# Patient Record
Sex: FEMALE | NOT HISPANIC OR LATINO
[De-identification: names, ages, dates, MRNs, and addresses within clinical notes are randomized per-mention and may not be internally consistent; named-entity substitution may affect disease eponyms.]

---

## 2020-12-08 DIAGNOSIS — Z01.818 ENCOUNTER FOR OTHER PREPROCEDURAL EXAMINATION: ICD-10-CM

## 2020-12-10 ENCOUNTER — APPOINTMENT (OUTPATIENT)
Dept: DISASTER EMERGENCY | Facility: CLINIC | Age: 80
End: 2020-12-10

## 2020-12-12 LAB — SARS-COV-2 N GENE NPH QL NAA+PROBE: NOT DETECTED

## 2020-12-24 ENCOUNTER — APPOINTMENT (OUTPATIENT)
Dept: DISASTER EMERGENCY | Facility: CLINIC | Age: 80
End: 2020-12-24

## 2020-12-26 LAB — SARS-COV-2 N GENE NPH QL NAA+PROBE: NOT DETECTED

## 2023-03-15 ENCOUNTER — APPOINTMENT (OUTPATIENT)
Dept: OTOLARYNGOLOGY | Facility: CLINIC | Age: 83
End: 2023-03-15
Payer: MEDICARE

## 2023-03-15 VITALS
HEIGHT: 64 IN | SYSTOLIC BLOOD PRESSURE: 145 MMHG | WEIGHT: 187 LBS | DIASTOLIC BLOOD PRESSURE: 77 MMHG | HEART RATE: 81 BPM | BODY MASS INDEX: 31.92 KG/M2

## 2023-03-15 PROCEDURE — 92557 COMPREHENSIVE HEARING TEST: CPT

## 2023-03-15 PROCEDURE — 99203 OFFICE O/P NEW LOW 30 MIN: CPT

## 2023-03-15 PROCEDURE — 92567 TYMPANOMETRY: CPT

## 2023-03-15 NOTE — HISTORY OF PRESENT ILLNESS
[de-identified] : Pt presents today c/o hearing loss and tinnitus. Pt notes people around her complain that the TV is too loud. Pt notes she has experienced right tinnitus recently, around the same time that she got the covid vaccine. Pt notes the hearing loss has been deteriorating gradually, unrelated to the covid vaccine. Pt notes she only notices the tinnitus when it is quiet. Pt notes she thinks her right ear might be slightly worse hearing than the left. Pt notes family history of hearing loss. Pt notes she can see good, but there is a difference between the left and right eye.

## 2023-03-15 NOTE — ASSESSMENT
[FreeTextEntry1] : Reviewed and reconciled medications, allergies, PMHx, PSHx, SocHx, FMHx.\par \par Pt presents today c/o hearing loss. Pt notes people around her complain that the TV is too loud. Pt notes she has experienced right tinnitus recently, around the same time that she got the covid vaccine. Pt notes the hearing loss has been deteriorating gradually, unrelated to the covid vaccine. \par \par Physical Exam -\par right ear: cerumen removed\par inflamed turbs, deviated septum right severe\par equal, previous cataract surgery in both eyes\par \par Audio:\par -TYMPS: TYPE A AU \par -AD: ESSENTIALLY -8000 HZ, WITH A MILD SNHL 3-6KHZ, 100% discrim at 65db, TPP -9\par -AS: ESSENTIALLY -8000 HZ, 100% discrim at 60db, TPP -3\par \par Plan:\par Audio - results interpreted by Dr. Mackey and reviewed with the patient. Consider hearing aids. FU 1 year.

## 2023-03-15 NOTE — PHYSICAL EXAM
[Hearing Pedraza Test (Tuning Fork On Forehead)] : no lateralization of tone [] : septum deviated to the right [Midline] : trachea located in midline position [Removed] : palatine tonsils previously removed [Normal] : orientation to person, place, and time: normal [FreeTextEntry8] : cerumen removed via curettage  [FreeTextEntry9] : . [de-identified] : severely deviated septum right [de-identified] : inflamed turbs  [FreeTextEntry2] : sinuses nontender to percussion. sensations intact.  [de-identified] : equal, previous cataract surgery in both eyes

## 2023-03-15 NOTE — REVIEW OF SYSTEMS
[Seasonal Allergies] : seasonal allergies [Hearing Loss] : hearing loss [Ear Noises] : ear noises [Eyes Itch] : itching of the eyes [Heartburn] : heartburn [Joint Pain] : joint pain [Negative] : Heme/Lymph [FreeTextEntry3] : watery [FreeTextEntry7] : reflux [FreeTextEntry1] : itching

## 2023-03-15 NOTE — DATA REVIEWED
[de-identified] : \par -TYMPS: TYPE A AU \par -AD: ESSENTIALLY -8000 HZ, WITH A MILD SNHL 3-6KHZ\par -AS: ESSENTIALLY -8000 HZ

## 2023-05-02 ENCOUNTER — OFFICE (OUTPATIENT)
Facility: LOCATION | Age: 83
Setting detail: OPHTHALMOLOGY
End: 2023-05-02
Payer: MEDICARE

## 2023-05-02 DIAGNOSIS — H43.813: ICD-10-CM

## 2023-05-02 DIAGNOSIS — H35.372: ICD-10-CM

## 2023-05-02 DIAGNOSIS — E11.9: ICD-10-CM

## 2023-05-02 DIAGNOSIS — H35.342: ICD-10-CM

## 2023-05-02 PROCEDURE — 92134 CPTRZ OPH DX IMG PST SGM RTA: CPT | Performed by: OPHTHALMOLOGY

## 2023-05-02 PROCEDURE — 92014 COMPRE OPH EXAM EST PT 1/>: CPT | Performed by: OPHTHALMOLOGY

## 2023-05-02 ASSESSMENT — KERATOMETRY
OD_K1POWER_DIOPTERS: 42.25
OS_K1POWER_DIOPTERS: 42.12
OS_K2POWER_DIOPTERS: 43.87
OD_AXISANGLE_DEGREES: 84
OS_AXISANGLE_DEGREES: 89
OD_K2POWER_DIOPTERS: 44.37

## 2023-05-02 ASSESSMENT — SPHEQUIV_DERIVED
OS_SPHEQUIV: -0.25
OD_SPHEQUIV: -0.625

## 2023-05-02 ASSESSMENT — REFRACTION_AUTOREFRACTION
OS_SPHERE: +0.25
OS_CYLINDER: -1.00
OD_CYLINDER: -0.75
OS_AXIS: 44
OD_SPHERE: -0.25
OD_AXIS: 81

## 2023-05-02 ASSESSMENT — AXIALLENGTH_DERIVED
OD_AL: 23.9094
OS_AL: 23.8782

## 2023-05-02 ASSESSMENT — VISUAL ACUITY
OD_BCVA: 20/25
OS_BCVA: 20/20

## 2023-05-02 ASSESSMENT — CONFRONTATIONAL VISUAL FIELD TEST (CVF)
OS_FINDINGS: FULL
OD_FINDINGS: FULL

## 2023-07-24 ENCOUNTER — OFFICE (OUTPATIENT)
Dept: URBAN - METROPOLITAN AREA CLINIC 109 | Facility: CLINIC | Age: 83
Setting detail: OPHTHALMOLOGY
End: 2023-07-24
Payer: MEDICARE

## 2023-07-24 DIAGNOSIS — H35.372: ICD-10-CM

## 2023-07-24 DIAGNOSIS — H43.813: ICD-10-CM

## 2023-07-24 DIAGNOSIS — E11.9: ICD-10-CM

## 2023-07-24 DIAGNOSIS — H35.342: ICD-10-CM

## 2023-07-24 DIAGNOSIS — H26.493: ICD-10-CM

## 2023-07-24 PROCEDURE — 92014 COMPRE OPH EXAM EST PT 1/>: CPT | Performed by: OPHTHALMOLOGY

## 2023-07-24 ASSESSMENT — VISUAL ACUITY
OS_BCVA: 20/20-1
OD_BCVA: 20/30-2

## 2023-07-24 ASSESSMENT — CONFRONTATIONAL VISUAL FIELD TEST (CVF)
OD_FINDINGS: FULL
OS_FINDINGS: FULL

## 2023-07-24 ASSESSMENT — TONOMETRY
OS_IOP_MMHG: 17
OD_IOP_MMHG: 17

## 2023-07-24 ASSESSMENT — REFRACTION_AUTOREFRACTION
OS_AXIS: 44
OS_CYLINDER: -1.00
OS_SPHERE: +0.25
OD_CYLINDER: -0.75
OD_SPHERE: -0.25
OD_AXIS: 81

## 2023-07-24 ASSESSMENT — KERATOMETRY
OD_AXISANGLE_DEGREES: 84
OS_AXISANGLE_DEGREES: 89
OS_K2POWER_DIOPTERS: 43.87
OD_K1POWER_DIOPTERS: 42.25
OS_K1POWER_DIOPTERS: 42.12
OD_K2POWER_DIOPTERS: 44.37

## 2023-07-24 ASSESSMENT — SPHEQUIV_DERIVED
OD_SPHEQUIV: -0.625
OS_SPHEQUIV: -0.25

## 2023-07-24 ASSESSMENT — AXIALLENGTH_DERIVED
OS_AL: 23.8782
OD_AL: 23.9094

## 2023-08-04 ENCOUNTER — OFFICE (OUTPATIENT)
Dept: URBAN - METROPOLITAN AREA CLINIC 109 | Facility: CLINIC | Age: 83
Setting detail: OPHTHALMOLOGY
End: 2023-08-04
Payer: MEDICARE

## 2023-08-04 DIAGNOSIS — H26.492: ICD-10-CM

## 2023-08-04 PROBLEM — H26.493 POSTERIOR CAPSULAR OPACIFICATION; BOTH EYES: Status: ACTIVE | Noted: 2023-07-24

## 2023-08-04 PROCEDURE — 66821 AFTER CATARACT LASER SURGERY: CPT | Performed by: OPHTHALMOLOGY

## 2023-08-04 ASSESSMENT — REFRACTION_AUTOREFRACTION
OD_AXIS: 81
OD_CYLINDER: -0.75
OS_CYLINDER: -1.00
OD_SPHERE: -0.25
OS_AXIS: 44
OS_SPHERE: +0.25

## 2023-08-04 ASSESSMENT — SPHEQUIV_DERIVED
OD_SPHEQUIV: -0.625
OS_SPHEQUIV: -0.25

## 2023-08-04 ASSESSMENT — KERATOMETRY
OD_K2POWER_DIOPTERS: 44.37
OD_K1POWER_DIOPTERS: 42.25
OS_K1POWER_DIOPTERS: 42.12
OS_AXISANGLE_DEGREES: 89
OD_AXISANGLE_DEGREES: 84
OS_K2POWER_DIOPTERS: 43.87

## 2023-08-04 ASSESSMENT — VISUAL ACUITY
OD_BCVA: 20/30-2
OS_BCVA: 20/20-1

## 2023-08-04 ASSESSMENT — CONFRONTATIONAL VISUAL FIELD TEST (CVF)
OS_FINDINGS: FULL
OD_FINDINGS: FULL

## 2023-08-04 ASSESSMENT — AXIALLENGTH_DERIVED
OS_AL: 23.8782
OD_AL: 23.9094

## 2023-08-18 ENCOUNTER — OFFICE (OUTPATIENT)
Dept: URBAN - METROPOLITAN AREA CLINIC 109 | Facility: CLINIC | Age: 83
Setting detail: OPHTHALMOLOGY
End: 2023-08-18
Payer: MEDICARE

## 2023-08-18 DIAGNOSIS — H40.033: ICD-10-CM

## 2023-08-18 DIAGNOSIS — H26.493: ICD-10-CM

## 2023-08-18 PROCEDURE — 99024 POSTOP FOLLOW-UP VISIT: CPT | Performed by: OPHTHALMOLOGY

## 2023-08-18 ASSESSMENT — REFRACTION_CURRENTRX
OS_ADD: +2.00
OD_AXIS: 033
OS_OVR_VA: 20/
OD_CYLINDER: -1.00
OS_CYLINDER: -0.50
OD_VPRISM_DIRECTION: PROGS
OD_OVR_VA: 20/
OD_ADD: +2.00
OD_SPHERE: +0.50
OS_AXIS: 038
OS_SPHERE: +0.50
OS_VPRISM_DIRECTION: PROGS

## 2023-08-18 ASSESSMENT — KERATOMETRY
OD_AXISANGLE_DEGREES: 84
OS_K2POWER_DIOPTERS: 43.87
OD_K1POWER_DIOPTERS: 42.25
OS_AXISANGLE_DEGREES: 89
OD_K2POWER_DIOPTERS: 44.37
OS_K1POWER_DIOPTERS: 42.12

## 2023-08-18 ASSESSMENT — CONFRONTATIONAL VISUAL FIELD TEST (CVF)
OD_FINDINGS: FULL
OS_FINDINGS: FULL

## 2023-08-18 ASSESSMENT — SPHEQUIV_DERIVED: OS_SPHEQUIV: -0.25

## 2023-08-18 ASSESSMENT — VISUAL ACUITY
OD_BCVA: 20/20-2
OS_BCVA: 20/20

## 2023-08-18 ASSESSMENT — REFRACTION_AUTOREFRACTION
OS_SPHERE: +0.25
OS_CYLINDER: -1.00
OD_CYLINDER: -0.25
OD_AXIS: 077
OS_AXIS: 041
OD_SPHERE: PLANO

## 2023-08-18 ASSESSMENT — AXIALLENGTH_DERIVED: OS_AL: 23.8782

## 2024-02-16 ENCOUNTER — OFFICE (OUTPATIENT)
Dept: URBAN - METROPOLITAN AREA CLINIC 109 | Facility: CLINIC | Age: 84
Setting detail: OPHTHALMOLOGY
End: 2024-02-16
Payer: MEDICARE

## 2024-02-16 DIAGNOSIS — H35.342: ICD-10-CM

## 2024-02-16 DIAGNOSIS — E11.9: ICD-10-CM

## 2024-02-16 DIAGNOSIS — H26.493: ICD-10-CM

## 2024-02-16 DIAGNOSIS — H53.10: ICD-10-CM

## 2024-02-16 DIAGNOSIS — H02.831: ICD-10-CM

## 2024-02-16 DIAGNOSIS — H53.2: ICD-10-CM

## 2024-02-16 DIAGNOSIS — H02.835: ICD-10-CM

## 2024-02-16 DIAGNOSIS — H02.832: ICD-10-CM

## 2024-02-16 DIAGNOSIS — H43.813: ICD-10-CM

## 2024-02-16 DIAGNOSIS — H02.834: ICD-10-CM

## 2024-02-16 DIAGNOSIS — H35.372: ICD-10-CM

## 2024-02-16 DIAGNOSIS — H52.4: ICD-10-CM

## 2024-02-16 PROCEDURE — 92134 CPTRZ OPH DX IMG PST SGM RTA: CPT | Performed by: OPHTHALMOLOGY

## 2024-02-16 PROCEDURE — 99213 OFFICE O/P EST LOW 20 MIN: CPT | Performed by: OPHTHALMOLOGY

## 2024-02-16 ASSESSMENT — REFRACTION_CURRENTRX
OS_VPRISM_DIRECTION: PROGS
OD_ADD: +2.00
OD_AXIS: 033
OD_OVR_VA: 20/
OS_OVR_VA: 20/
OS_ADD: +2.00
OD_CYLINDER: -1.00
OS_AXIS: 038
OS_CYLINDER: -0.50
OD_SPHERE: +0.50
OD_VPRISM_DIRECTION: PROGS
OS_SPHERE: +0.50

## 2024-02-16 ASSESSMENT — CONFRONTATIONAL VISUAL FIELD TEST (CVF)
OD_FINDINGS: FULL
OS_FINDINGS: FULL

## 2024-02-16 ASSESSMENT — REFRACTION_MANIFEST
OD_CYLINDER: -0.75
OD_AXIS: 25
OD_VA1: 20/20
OS_ADD: +2.50
OD_SPHERE: +0.50
OS_SPHERE: PLANO
OS_CYLINDER: -0.50
OS_VA1: 20/20
OS_AXIS: 40
OD_ADD: +2.50

## 2024-02-16 ASSESSMENT — SPHEQUIV_DERIVED
OD_SPHEQUIV: 0.125
OS_SPHEQUIV: -0.25

## 2024-02-16 ASSESSMENT — REFRACTION_AUTOREFRACTION
OD_AXIS: 077
OD_CYLINDER: -0.25
OS_AXIS: 041
OS_CYLINDER: -1.00
OD_SPHERE: PLANO
OS_SPHERE: +0.25

## 2024-02-16 ASSESSMENT — LID POSITION - DERMATOCHALASIS
OS_DERMATOCHALASIS: LLL LUL 1+
OD_DERMATOCHALASIS: RLL RUL 1+

## 2024-02-28 ENCOUNTER — OFFICE (OUTPATIENT)
Dept: URBAN - METROPOLITAN AREA CLINIC 6 | Facility: CLINIC | Age: 84
Setting detail: OPHTHALMOLOGY
End: 2024-02-28
Payer: MEDICARE

## 2024-02-28 DIAGNOSIS — H52.7: ICD-10-CM

## 2024-02-28 DIAGNOSIS — H50.22: ICD-10-CM

## 2024-02-28 DIAGNOSIS — H53.2: ICD-10-CM

## 2024-02-28 PROBLEM — H02.834 DERMATOCHALASIS; RIGHT UPPER LID, RIGHT LOWER LID, LEFT UPPER LID, LEFT LOWER LID: Status: ACTIVE | Noted: 2024-02-16

## 2024-02-28 PROBLEM — H02.831 DERMATOCHALASIS; RIGHT UPPER LID, RIGHT LOWER LID, LEFT UPPER LID, LEFT LOWER LID: Status: ACTIVE | Noted: 2024-02-16

## 2024-02-28 PROBLEM — H02.835 DERMATOCHALASIS; RIGHT UPPER LID, RIGHT LOWER LID, LEFT UPPER LID, LEFT LOWER LID: Status: ACTIVE | Noted: 2024-02-16

## 2024-02-28 PROBLEM — H02.832 DERMATOCHALASIS; RIGHT UPPER LID, RIGHT LOWER LID, LEFT UPPER LID, LEFT LOWER LID: Status: ACTIVE | Noted: 2024-02-16

## 2024-02-28 PROCEDURE — 99214 OFFICE O/P EST MOD 30 MIN: CPT | Performed by: OPHTHALMOLOGY

## 2024-02-28 PROCEDURE — 92015 DETERMINE REFRACTIVE STATE: CPT | Performed by: OPHTHALMOLOGY

## 2024-02-28 PROCEDURE — 92060 SENSORIMOTOR EXAMINATION: CPT | Performed by: OPHTHALMOLOGY

## 2024-02-28 ASSESSMENT — REFRACTION_MANIFEST
OS_AXIS: 045
OD_VPRISM: BU
OD_ADD: +2.50
OS_ADD: +2.50
OD_SPHERE: PLANO
OS_SPHERE: +0.25
OS_VPRISM: BD
OD_VA1: 20/20
OS_CYLINDER: -1.00
OD_AXIS: 040
OS_VA1: 20/20
OD_CYLINDER: -0.25
OU_VA: 20/20

## 2024-02-28 ASSESSMENT — CONFRONTATIONAL VISUAL FIELD TEST (CVF)
OS_FINDINGS: FULL
OD_FINDINGS: FULL

## 2024-02-28 ASSESSMENT — REFRACTION_AUTOREFRACTION
OD_CYLINDER: -0.25
OS_AXIS: 043
OD_AXIS: 042
OD_SPHERE: +0.25
OS_SPHERE: +0.25
OS_CYLINDER: -1.00

## 2024-02-28 ASSESSMENT — LID POSITION - DERMATOCHALASIS
OS_DERMATOCHALASIS: LLL LUL 1+
OD_DERMATOCHALASIS: RLL RUL 1+

## 2024-02-28 ASSESSMENT — REFRACTION_CURRENTRX
OD_CYLINDER: -0.50
OS_VPRISM_DIRECTION: PROGS
OS_SPHERE: +0.25
OD_ADD: +2.00
OS_CYLINDER: -0.50
OD_SPHERE: PLANO
OS_AXIS: 040
OD_AXIS: 032
OD_OVR_VA: 20/
OD_VPRISM_DIRECTION: PROGS
OS_ADD: +2.00
OS_OVR_VA: 20/

## 2024-02-28 ASSESSMENT — SPHEQUIV_DERIVED
OS_SPHEQUIV: -0.25
OS_SPHEQUIV: -0.25
OD_SPHEQUIV: 0.125

## 2024-02-29 PROBLEM — H53.2 DIPLOPIA ; BOTH EYES: Status: ACTIVE | Noted: 2024-02-16

## 2024-03-13 ENCOUNTER — APPOINTMENT (OUTPATIENT)
Dept: OTOLARYNGOLOGY | Facility: CLINIC | Age: 84
End: 2024-03-13
Payer: MEDICARE

## 2024-03-13 VITALS
HEIGHT: 64 IN | SYSTOLIC BLOOD PRESSURE: 121 MMHG | DIASTOLIC BLOOD PRESSURE: 71 MMHG | HEART RATE: 79 BPM | WEIGHT: 185 LBS | BODY MASS INDEX: 31.58 KG/M2

## 2024-03-13 DIAGNOSIS — H93.11 TINNITUS, RIGHT EAR: ICD-10-CM

## 2024-03-13 DIAGNOSIS — J34.2 DEVIATED NASAL SEPTUM: ICD-10-CM

## 2024-03-13 DIAGNOSIS — J31.0 CHRONIC RHINITIS: ICD-10-CM

## 2024-03-13 DIAGNOSIS — E11.9 TYPE 2 DIABETES MELLITUS W/OUT COMPLICATIONS: ICD-10-CM

## 2024-03-13 DIAGNOSIS — H90.3 SENSORINEURAL HEARING LOSS, BILATERAL: ICD-10-CM

## 2024-03-13 PROCEDURE — 99213 OFFICE O/P EST LOW 20 MIN: CPT

## 2024-03-13 PROCEDURE — 92567 TYMPANOMETRY: CPT

## 2024-03-13 PROCEDURE — 92557 COMPREHENSIVE HEARING TEST: CPT

## 2024-03-13 RX ORDER — MULTIVIT-MIN/FA/LYCOPEN/LUTEIN .4-300-25
TABLET ORAL
Refills: 0 | Status: ACTIVE | COMMUNITY

## 2024-03-13 RX ORDER — GLUCOSAMINE SULFATE 500 MG
CAPSULE ORAL
Refills: 0 | Status: ACTIVE | COMMUNITY

## 2024-03-13 RX ORDER — ESOMEPRAZOLE MAGNESIUM 20 MG/1
TABLET, DELAYED RELEASE ORAL
Refills: 0 | Status: ACTIVE | COMMUNITY

## 2024-03-13 RX ORDER — CALCIUM CARBONATE/VITAMIN D3 600 MG-10
TABLET ORAL
Refills: 0 | Status: ACTIVE | COMMUNITY

## 2024-03-13 RX ORDER — METFORMIN HYDROCHLORIDE 625 MG/1
TABLET ORAL
Refills: 0 | Status: ACTIVE | COMMUNITY

## 2024-03-13 RX ORDER — LOSARTAN POTASSIUM 25 MG/1
25 TABLET, FILM COATED ORAL
Refills: 0 | Status: ACTIVE | COMMUNITY

## 2024-03-13 RX ORDER — CALCIUM CITRATE/VITAMIN D3 315MG-6.25
TABLET ORAL
Refills: 0 | Status: ACTIVE | COMMUNITY

## 2024-03-13 RX ORDER — SITAGLIPTIN 100 MG/1
TABLET, FILM COATED ORAL
Refills: 0 | Status: ACTIVE | COMMUNITY

## 2024-03-13 NOTE — ADDENDUM
[FreeTextEntry1] :  Documented by Nick Camargo acting as scribe for Dr. Mackey on 03/13/2024. All Medical record entries made by the Scribe were at my, Dr. Mackey, direction and personally dictated by me on 03/13/2024 . I have reviewed the chart and agree that the record accurately reflects my personal performance of the history, physical exam, assessment and plan. I have also personally directed, reviewed, and agreed with the discharge instructions.

## 2024-03-13 NOTE — PHYSICAL EXAM
[Hearing Pedraza Test (Tuning Fork On Forehead)] : no lateralization of tone [] : septum deviated to the right [Midline] : trachea located in midline position [Removed] : palatine tonsils previously removed [Normal] : orientation to person, place, and time: normal [Hearing Loss Right Only] : normal [Hearing Loss Left Only] : normal [FreeTextEntry8] : dryness [de-identified] :  mildly inflamed turbinates [FreeTextEntry2] :  sinuses nontender to percussion.  sensations intact

## 2024-03-13 NOTE — DATA REVIEWED
[de-identified] : Type A tymps AU AD: normal to mild/moderate SNHL, 250-8000 Hz AS: normal to mild SNHL, 250-8000 Hz slight dec 4-6K AS since 3/992300 REC: ENT f/u, re-eval per MD, consider hearing aids/TV ears

## 2024-03-13 NOTE — CONSULT LETTER
[Dear  ___] : Dear  [unfilled], [Courtesy Letter:] : I had the pleasure of seeing your patient, [unfilled], in my office today. [Consult Closing:] : Thank you very much for allowing me to participate in the care of this patient.  If you have any questions, please do not hesitate to contact me. [Please see my note below.] : Please see my note below. [Sincerely,] : Sincerely, [FreeTextEntry3] :  Jtet Mackey MD FACS

## 2024-03-13 NOTE — HISTORY OF PRESENT ILLNESS
[de-identified] : Pt with h/o right ear tinnitus and asymmetric SNHL (right worse than left) presents today for a yearly follow up. Pt states that she is doing well. Pt denies changes in hearing. Pt states that she still gets tinnitus in her right ear.

## 2024-05-09 ENCOUNTER — OFFICE (OUTPATIENT)
Dept: URBAN - METROPOLITAN AREA CLINIC 32 | Facility: CLINIC | Age: 84
Setting detail: OPHTHALMOLOGY
End: 2024-05-09
Payer: MEDICARE

## 2024-05-09 DIAGNOSIS — H35.342: ICD-10-CM

## 2024-05-09 DIAGNOSIS — H35.372: ICD-10-CM

## 2024-05-09 DIAGNOSIS — E11.3293: ICD-10-CM

## 2024-05-09 DIAGNOSIS — H40.033: ICD-10-CM

## 2024-05-09 DIAGNOSIS — H43.813: ICD-10-CM

## 2024-05-09 PROCEDURE — 92134 CPTRZ OPH DX IMG PST SGM RTA: CPT | Performed by: OPHTHALMOLOGY

## 2024-05-09 PROCEDURE — 92201 OPSCPY EXTND RTA DRAW UNI/BI: CPT | Performed by: OPHTHALMOLOGY

## 2024-05-09 PROCEDURE — 99213 OFFICE O/P EST LOW 20 MIN: CPT | Performed by: OPHTHALMOLOGY

## 2024-05-09 ASSESSMENT — LID POSITION - DERMATOCHALASIS
OS_DERMATOCHALASIS: LLL LUL 1+
OD_DERMATOCHALASIS: RLL RUL 1+

## 2024-05-16 ENCOUNTER — OFFICE (OUTPATIENT)
Dept: URBAN - METROPOLITAN AREA CLINIC 109 | Facility: CLINIC | Age: 84
Setting detail: OPHTHALMOLOGY
End: 2024-05-16
Payer: MEDICARE

## 2024-05-16 DIAGNOSIS — H02.831: ICD-10-CM

## 2024-05-16 DIAGNOSIS — H43.813: ICD-10-CM

## 2024-05-16 DIAGNOSIS — H02.832: ICD-10-CM

## 2024-05-16 DIAGNOSIS — H53.10: ICD-10-CM

## 2024-05-16 DIAGNOSIS — H35.342: ICD-10-CM

## 2024-05-16 DIAGNOSIS — H35.372: ICD-10-CM

## 2024-05-16 DIAGNOSIS — E11.3293: ICD-10-CM

## 2024-05-16 DIAGNOSIS — H02.835: ICD-10-CM

## 2024-05-16 DIAGNOSIS — H26.491: ICD-10-CM

## 2024-05-16 DIAGNOSIS — H53.2: ICD-10-CM

## 2024-05-16 DIAGNOSIS — H02.834: ICD-10-CM

## 2024-05-16 PROCEDURE — 99213 OFFICE O/P EST LOW 20 MIN: CPT | Performed by: OPHTHALMOLOGY

## 2024-05-16 ASSESSMENT — LID POSITION - DERMATOCHALASIS
OS_DERMATOCHALASIS: LLL LUL 1+
OD_DERMATOCHALASIS: RLL RUL 1+

## 2024-05-16 ASSESSMENT — CONFRONTATIONAL VISUAL FIELD TEST (CVF)
OD_FINDINGS: FULL
OS_FINDINGS: FULL

## 2024-10-15 ENCOUNTER — OFFICE (OUTPATIENT)
Dept: URBAN - METROPOLITAN AREA CLINIC 109 | Facility: CLINIC | Age: 84
Setting detail: OPHTHALMOLOGY
End: 2024-10-15
Payer: MEDICARE

## 2024-10-15 DIAGNOSIS — E11.3293: ICD-10-CM

## 2024-10-15 DIAGNOSIS — H53.2: ICD-10-CM

## 2024-10-15 DIAGNOSIS — H26.491: ICD-10-CM

## 2024-10-15 DIAGNOSIS — H02.834: ICD-10-CM

## 2024-10-15 DIAGNOSIS — H43.813: ICD-10-CM

## 2024-10-15 DIAGNOSIS — H02.835: ICD-10-CM

## 2024-10-15 DIAGNOSIS — H35.342: ICD-10-CM

## 2024-10-15 DIAGNOSIS — H02.832: ICD-10-CM

## 2024-10-15 DIAGNOSIS — L30.8: ICD-10-CM

## 2024-10-15 DIAGNOSIS — H02.831: ICD-10-CM

## 2024-10-15 DIAGNOSIS — H53.10: ICD-10-CM

## 2024-10-15 DIAGNOSIS — H35.372: ICD-10-CM

## 2024-10-15 PROCEDURE — 99213 OFFICE O/P EST LOW 20 MIN: CPT | Performed by: OPHTHALMOLOGY

## 2024-10-15 ASSESSMENT — REFRACTION_AUTOREFRACTION
OS_AXIS: 55
OD_AXIS: 95
OS_SPHERE: +0.50
OS_CYLINDER: -1.00
OD_CYLINDER: -0.25
OD_SPHERE: +0.25

## 2024-10-15 ASSESSMENT — TONOMETRY
OS_IOP_MMHG: 19
OD_IOP_MMHG: 17
OS_IOP_MMHG: 20
OD_IOP_MMHG: 17

## 2024-10-15 ASSESSMENT — VISUAL ACUITY
OS_BCVA: 20/20
OD_BCVA: 20/20

## 2024-10-15 ASSESSMENT — KERATOMETRY
OS_K2POWER_DIOPTERS: 44.25
OD_K2POWER_DIOPTERS: 44.25
OD_K1POWER_DIOPTERS: 42.50
OD_AXISANGLE_DEGREES: 085
OS_K1POWER_DIOPTERS: 42.25
OS_AXISANGLE_DEGREES: 090

## 2024-10-15 ASSESSMENT — CONFRONTATIONAL VISUAL FIELD TEST (CVF)
OS_FINDINGS: FULL
OD_FINDINGS: FULL

## 2024-10-15 ASSESSMENT — LID POSITION - DERMATOCHALASIS
OD_DERMATOCHALASIS: RLL RUL 1+
OS_DERMATOCHALASIS: LLL LUL 1+

## 2025-01-14 ENCOUNTER — DOCTOR'S OFFICE (OUTPATIENT)
Facility: LOCATION | Age: 85
Setting detail: OPHTHALMOLOGY
End: 2025-01-14
Payer: MEDICARE

## 2025-01-14 DIAGNOSIS — E11.3293: ICD-10-CM

## 2025-01-14 DIAGNOSIS — H35.372: ICD-10-CM

## 2025-01-14 DIAGNOSIS — H43.813: ICD-10-CM

## 2025-01-14 DIAGNOSIS — H35.342: ICD-10-CM

## 2025-01-14 PROCEDURE — 99214 OFFICE O/P EST MOD 30 MIN: CPT | Performed by: OPHTHALMOLOGY

## 2025-01-14 PROCEDURE — 92134 CPTRZ OPH DX IMG PST SGM RTA: CPT | Performed by: OPHTHALMOLOGY

## 2025-01-14 ASSESSMENT — VISUAL ACUITY
OS_BCVA: 20/20-2
OD_BCVA: 20/20

## 2025-01-14 ASSESSMENT — LID POSITION - DERMATOCHALASIS
OS_DERMATOCHALASIS: LLL LUL 1+
OD_DERMATOCHALASIS: RLL RUL 1+

## 2025-01-14 ASSESSMENT — KERATOMETRY
OS_AXISANGLE_DEGREES: 090
OS_K2POWER_DIOPTERS: 44.25
OD_AXISANGLE_DEGREES: 085
OD_K1POWER_DIOPTERS: 42.50
OD_K2POWER_DIOPTERS: 44.25
OS_K1POWER_DIOPTERS: 42.25

## 2025-01-14 ASSESSMENT — REFRACTION_AUTOREFRACTION
OS_SPHERE: +0.50
OD_CYLINDER: -0.25
OD_AXIS: 95
OD_SPHERE: +0.25
OS_AXIS: 55
OS_CYLINDER: -1.00

## 2025-04-22 ENCOUNTER — OFFICE (OUTPATIENT)
Dept: URBAN - METROPOLITAN AREA CLINIC 109 | Facility: CLINIC | Age: 85
Setting detail: OPHTHALMOLOGY
End: 2025-04-22
Payer: MEDICARE

## 2025-04-22 DIAGNOSIS — H35.372: ICD-10-CM

## 2025-04-22 DIAGNOSIS — H02.831: ICD-10-CM

## 2025-04-22 DIAGNOSIS — H43.813: ICD-10-CM

## 2025-04-22 DIAGNOSIS — H02.834: ICD-10-CM

## 2025-04-22 DIAGNOSIS — E11.3293: ICD-10-CM

## 2025-04-22 DIAGNOSIS — H35.342: ICD-10-CM

## 2025-04-22 DIAGNOSIS — H02.832: ICD-10-CM

## 2025-04-22 DIAGNOSIS — H26.491: ICD-10-CM

## 2025-04-22 DIAGNOSIS — H02.835: ICD-10-CM

## 2025-04-22 PROCEDURE — 99213 OFFICE O/P EST LOW 20 MIN: CPT | Performed by: OPHTHALMOLOGY

## 2025-04-22 ASSESSMENT — KERATOMETRY
OS_AXISANGLE_DEGREES: 090
OS_K1POWER_DIOPTERS: 42.25
OS_K2POWER_DIOPTERS: 44.25
OD_AXISANGLE_DEGREES: 085
OD_K1POWER_DIOPTERS: 42.50
OD_K2POWER_DIOPTERS: 44.25

## 2025-04-22 ASSESSMENT — REFRACTION_AUTOREFRACTION
OS_AXIS: 55
OD_SPHERE: +0.25
OD_CYLINDER: -0.25
OS_SPHERE: +0.50
OS_CYLINDER: -1.00
OD_AXIS: 95

## 2025-04-22 ASSESSMENT — REFRACTION_MANIFEST
OS_SPHERE: +0.25
OS_AXIS: 45
OD_CYLINDER: -0.25
OS_CYLINDER: -1.00
OD_SPHERE: PLANO
OD_AXIS: 40
OD_VA1: 20/20
OS_VA1: 20/20

## 2025-04-22 ASSESSMENT — VISUAL ACUITY
OD_BCVA: 20/20
OS_BCVA: 20/20

## 2025-04-22 ASSESSMENT — CONFRONTATIONAL VISUAL FIELD TEST (CVF)
OD_FINDINGS: FULL
OS_FINDINGS: FULL

## 2025-04-22 ASSESSMENT — TONOMETRY
OD_IOP_MMHG: 17
OS_IOP_MMHG: 17

## 2025-04-22 ASSESSMENT — LID POSITION - DERMATOCHALASIS
OS_DERMATOCHALASIS: LLL LUL 1+
OD_DERMATOCHALASIS: RLL RUL 1+

## 2025-05-28 ENCOUNTER — NON-APPOINTMENT (OUTPATIENT)
Age: 85
End: 2025-05-28

## 2025-05-28 ENCOUNTER — APPOINTMENT (OUTPATIENT)
Dept: OTOLARYNGOLOGY | Facility: CLINIC | Age: 85
End: 2025-05-28
Payer: MEDICARE

## 2025-05-28 VITALS
HEART RATE: 81 BPM | DIASTOLIC BLOOD PRESSURE: 64 MMHG | WEIGHT: 181 LBS | HEIGHT: 64 IN | SYSTOLIC BLOOD PRESSURE: 115 MMHG | BODY MASS INDEX: 30.9 KG/M2

## 2025-05-28 DIAGNOSIS — J34.2 DEVIATED NASAL SEPTUM: ICD-10-CM

## 2025-05-28 DIAGNOSIS — J31.0 CHRONIC RHINITIS: ICD-10-CM

## 2025-05-28 DIAGNOSIS — H90.3 SENSORINEURAL HEARING LOSS, BILATERAL: ICD-10-CM

## 2025-05-28 DIAGNOSIS — J34.89 OTHER SPECIFIED DISORDERS OF NOSE AND NASAL SINUSES: ICD-10-CM

## 2025-05-28 DIAGNOSIS — H93.11 TINNITUS, RIGHT EAR: ICD-10-CM

## 2025-05-28 PROCEDURE — 92557 COMPREHENSIVE HEARING TEST: CPT

## 2025-05-28 PROCEDURE — 92567 TYMPANOMETRY: CPT

## 2025-05-28 PROCEDURE — 99213 OFFICE O/P EST LOW 20 MIN: CPT

## 2025-05-28 RX ORDER — GLUCOSA SU 2KCL/CHONDROITIN SU 250-200 MG
CAPSULE ORAL
Refills: 0 | Status: ACTIVE | COMMUNITY

## 2025-05-28 RX ORDER — ATENOLOL 25 MG/1
25 TABLET ORAL
Refills: 0 | Status: ACTIVE | COMMUNITY

## 2025-06-18 ENCOUNTER — APPOINTMENT (OUTPATIENT)
Dept: PHARMACY | Facility: CLINIC | Age: 85
End: 2025-06-18
Payer: SELF-PAY

## 2025-06-18 PROCEDURE — V5010 ASSESSMENT FOR HEARING AID: CPT | Mod: NC

## 2025-07-22 ENCOUNTER — DOCTOR'S OFFICE (OUTPATIENT)
Facility: LOCATION | Age: 85
Setting detail: OPHTHALMOLOGY
End: 2025-07-22
Payer: MEDICARE

## 2025-07-22 DIAGNOSIS — H35.372: ICD-10-CM

## 2025-07-22 DIAGNOSIS — H43.813: ICD-10-CM

## 2025-07-22 DIAGNOSIS — E11.3293: ICD-10-CM

## 2025-07-22 DIAGNOSIS — H35.342: ICD-10-CM

## 2025-07-22 PROCEDURE — 92134 CPTRZ OPH DX IMG PST SGM RTA: CPT | Performed by: OPHTHALMOLOGY

## 2025-07-22 PROCEDURE — 92235 FLUORESCEIN ANGRPH MLTIFRAME: CPT | Performed by: OPHTHALMOLOGY

## 2025-07-22 PROCEDURE — 99213 OFFICE O/P EST LOW 20 MIN: CPT | Performed by: OPHTHALMOLOGY

## 2025-07-22 ASSESSMENT — REFRACTION_MANIFEST
OS_CYLINDER: -1.00
OD_VA1: 20/20
OS_SPHERE: +0.25
OS_VA1: 20/20
OD_SPHERE: PLANO
OS_AXIS: 45
OD_CYLINDER: -0.25
OD_AXIS: 40

## 2025-07-22 ASSESSMENT — KERATOMETRY
OS_K1POWER_DIOPTERS: 42.25
OS_AXISANGLE_DEGREES: 090
OD_K1POWER_DIOPTERS: 42.50
OD_K2POWER_DIOPTERS: 44.25
OD_AXISANGLE_DEGREES: 085
OS_K2POWER_DIOPTERS: 44.25

## 2025-07-22 ASSESSMENT — REFRACTION_AUTOREFRACTION
OD_CYLINDER: -0.25
OD_AXIS: 95
OD_SPHERE: +0.25
OS_SPHERE: +0.50
OS_CYLINDER: -1.00
OS_AXIS: 55

## 2025-07-22 ASSESSMENT — LID POSITION - DERMATOCHALASIS
OS_DERMATOCHALASIS: LLL LUL 1+
OD_DERMATOCHALASIS: RLL RUL 1+

## 2025-07-22 ASSESSMENT — VISUAL ACUITY
OD_BCVA: 20/20
OS_BCVA: 20/20